# Patient Record
Sex: MALE | Race: WHITE | NOT HISPANIC OR LATINO | Employment: OTHER | URBAN - METROPOLITAN AREA
[De-identification: names, ages, dates, MRNs, and addresses within clinical notes are randomized per-mention and may not be internally consistent; named-entity substitution may affect disease eponyms.]

---

## 2019-06-18 ENCOUNTER — HOSPITAL ENCOUNTER (EMERGENCY)
Facility: MEDICAL CENTER | Age: 59
End: 2019-06-18
Attending: EMERGENCY MEDICINE
Payer: OTHER MISCELLANEOUS

## 2019-06-18 VITALS
SYSTOLIC BLOOD PRESSURE: 137 MMHG | HEART RATE: 119 BPM | BODY MASS INDEX: 33.71 KG/M2 | DIASTOLIC BLOOD PRESSURE: 99 MMHG | TEMPERATURE: 98 F | OXYGEN SATURATION: 96 % | HEIGHT: 72 IN | WEIGHT: 248.9 LBS | RESPIRATION RATE: 15 BRPM

## 2019-06-18 DIAGNOSIS — Z76.0 MEDICATION REFILL: ICD-10-CM

## 2019-06-18 PROCEDURE — 99283 EMERGENCY DEPT VISIT LOW MDM: CPT

## 2019-06-18 RX ORDER — MORPHINE SULFATE 50 MG/1
50 CAPSULE, EXTENDED RELEASE ORAL DAILY
Qty: 5 CAP | Refills: 0 | Status: SHIPPED | OUTPATIENT
Start: 2019-06-18 | End: 2019-06-23

## 2019-06-18 NOTE — ED TRIAGE NOTES
Chief Complaint   Patient presents with   • Medication Refill     pt needs Rx for Jennifer and morphine     Pt reports that he is traveling to [place] for vacation. Cannot get his medication filled as he lives in [place]. States that he has RSD and has been taking his meds x 10 years.  /99   Pulse (!) 119   Temp 36.7 °C (98 °F) (Temporal)   Resp 15   Ht 1.829 m (6')   Wt 112.9 kg (248 lb 14.4 oz)   SpO2 96% .  Pt informed of wait times. Educated on triage process.  Asked to return to triage RN for any new or worsening of symptoms. Thanked for patience.

## 2019-06-19 NOTE — ED NOTES
Pt D/C to home. D/C instructions and prescriptions given. Pt v/u. Controlled substance consent signed and on chart. Pt leaves ED with no acute changes, complaints or concerns.

## 2019-06-19 NOTE — ED PROVIDER NOTES
"ED Provider Note    CHIEF COMPLAINT  Chief Complaint   Patient presents with   • Medication Refill     pt needs Rx for Jennifer and morphine       HPI  Randolph Chen is a 58 y.o. male here for evaluation of a medication refill. The pt states that he is here from [place], and needs to refill his medication of morphine. The pt states he takes this for chronic pain, and has no other medical concerns. The pt has no vomiting, no fever, no cp, and no sob. He states he is traveling to [place], and wanted to be sure \"I didn't run out.'      PAST MEDICAL HISTORY   has a past medical history of RSD (reflex sympathetic dystrophy).    SOCIAL HISTORY  Social History     Social History Main Topics   • Smoking status: Never Smoker   • Smokeless tobacco: Never Used   • Alcohol use No   • Drug use: No   • Sexual activity: Not on file       SURGICAL HISTORY  patient denies any surgical history    CURRENT MEDICATIONS  Home Medications    **Home medications have not yet been reviewed for this encounter**         ALLERGIES  Allergies   Allergen Reactions   • Iodine      \"makes my nose run.\"       REVIEW OF SYSTEMS  See HPI for further details. Review of systems as above, otherwise all other systems are negative.     PHYSICAL EXAM  VITAL SIGNS: /99   Pulse (!) 119   Temp 36.7 °C (98 °F) (Temporal)   Resp 15   Ht 1.829 m (6')   Wt 112.9 kg (248 lb 14.4 oz)   SpO2 96%   BMI 33.76 kg/m²     Constitutional: Well developed, well nourished. No acute distress.  HEENT: Normocephalic, atraumatic. MMM  Neck: Supple, Full range of motion   Chest/Pulmonary:  No respiratory distress.  Equal expansion   Musculoskeletal: No deformity, no edema, neurovascular intact.   Neuro: Clear speech, appropriate, cooperative, cranial nerves II-XII grossly intact. Tremor.   Psych: Normal mood and affect      PROCEDURES     MEDICAL RECORD  I have reviewed patient's medical record and pertinent results are listed above.    COURSE & MEDICAL " DECISION MAKING  I have reviewed any medical record information, laboratory studies and radiographic results as noted above.    5:21 PM  I explained to the pt that I can refill him for 5 days, but that he needs to see his own doctor for follow up.  He will do so, but says he traveling so he will have to call his own doctor.    I you have had any blood pressure issues while here in the emergency department, please see your doctor for a further evaluation or work up.    Differential diagnoses include but not limited to: med refill    This patient presents with medication refill .  At this time, I have counseled the patient/family regarding their medications, pain control, and follow up.  They will continue their medications, if any, as prescribed.  They will return immediately for any worsening symptoms and/or any other medical concerns.  They will see their doctor, or contact the doctor provided, in 1-2 days for follow up.       FINAL IMPRESSION  Medication refill      Electronically signed by: Cal Pardo, 6/18/2019 5:18 PM